# Patient Record
Sex: FEMALE | Race: BLACK OR AFRICAN AMERICAN | Employment: UNEMPLOYED | URBAN - METROPOLITAN AREA
[De-identification: names, ages, dates, MRNs, and addresses within clinical notes are randomized per-mention and may not be internally consistent; named-entity substitution may affect disease eponyms.]

---

## 2022-09-13 DIAGNOSIS — Z77.120 MOLD EXPOSURE: Primary | ICD-10-CM

## 2022-09-20 DIAGNOSIS — Z77.120 MOLD EXPOSURE: ICD-10-CM

## 2022-09-20 PROCEDURE — 71046 X-RAY EXAM CHEST 2 VIEWS: CPT | Performed by: INTERNAL MEDICINE

## 2022-11-01 ENCOUNTER — OFFICE VISIT (OUTPATIENT)
Dept: PULMONOLOGY | Age: 57
End: 2022-11-01

## 2022-11-01 VITALS
HEART RATE: 87 BPM | RESPIRATION RATE: 20 BRPM | OXYGEN SATURATION: 97 % | WEIGHT: 176 LBS | TEMPERATURE: 98 F | SYSTOLIC BLOOD PRESSURE: 120 MMHG | DIASTOLIC BLOOD PRESSURE: 80 MMHG | HEIGHT: 64 IN | BODY MASS INDEX: 30.05 KG/M2

## 2022-11-01 DIAGNOSIS — Z77.120 MOLD EXPOSURE: Primary | ICD-10-CM

## 2022-11-01 DIAGNOSIS — R06.02 SOB (SHORTNESS OF BREATH): ICD-10-CM

## 2022-11-01 LAB
EXPIRATORY TIME: NORMAL
FEF 25-75% %PRED-PRE: NORMAL
FEF 25-75% PRED: NORMAL
FEF 25-75%-PRE: NORMAL
FEV1 %PRED-PRE: 85 %
FEV1 PRED: NORMAL
FEV1/FVC %PRED-PRE: NORMAL
FEV1/FVC PRED: NORMAL
FEV1/FVC: 79 %
FEV1: 1.89 L
FVC %PRED-PRE: 85 %
FVC PRED: NORMAL
FVC: 2.38 L
PEF %PRED-PRE: NORMAL
PEF PRED: NORMAL
PEF-PRE: NORMAL

## 2022-11-01 PROCEDURE — 94010 BREATHING CAPACITY TEST: CPT | Performed by: INTERNAL MEDICINE

## 2022-11-01 PROCEDURE — 99204 OFFICE O/P NEW MOD 45 MIN: CPT | Performed by: INTERNAL MEDICINE

## 2022-11-01 RX ORDER — ALBUTEROL SULFATE 90 UG/1
2 AEROSOL, METERED RESPIRATORY (INHALATION) EVERY 6 HOURS PRN
Qty: 1 EACH | Refills: 11 | Status: SHIPPED | OUTPATIENT
Start: 2022-11-01

## 2022-11-01 ASSESSMENT — PULMONARY FUNCTION TESTS
FVC: 2.38
FVC_PERCENT_PREDICTED_PRE: 85
FEV1_PERCENT_PREDICTED_PRE: 85
FEV1: 1.89
FEV1/FVC: 79

## 2022-11-01 NOTE — PROGRESS NOTES
Patient Name:  Watson De La Paz                             YOB: 1965  MRN: [id number]                                              Office Visit 11/1/2022    ASSESSMENT AND PLAN:  (Medical Decision Making)    Maday Mccormick was seen today for shortness of breath. Diagnoses and all orders for this visit:    Mold exposure: The significance of this is unclear. Its extremely rare to have fungal infections related to mold exposure. More likely it could affect allergies or asthma. She does have some remote history of asthma and allergies it sounds like so this very well may be aggravating symptoms. We discussed that we would need to perform further evaluation to attempt to tie any allergen exposure to respiratory symptoms and currently plan to do a methacholine challenge. She can use albuterol as needed for now and would recommend a trial of over-the-counter antihistamine such as Zyrtec as well. If there were really need for legal documentation she would probably need an allergy evaluation to demonstrate allergy to the reported allergen. -     albuterol sulfate HFA (PROVENTIL;VENTOLIN;PROAIR) 108 (90 Base) MCG/ACT inhaler; Inhale 2 puffs into the lungs every 6 hours as needed for Wheezing    SOB (shortness of breath): Sounds to have some remote history of asthma and some family history of mild intermittent asthma. We will get methacholine scheduled to confirm a diagnosis of asthma and can try albuterol as needed for now. If evidence for asthma persistent symptoms we could add an inhaled steroid in the near future. -     Spirometry Without Bronchodilator  -     albuterol sulfate HFA (PROVENTIL;VENTOLIN;PROAIR) 108 (90 Base) MCG/ACT inhaler;  Inhale 2 puffs into the lungs every 6 hours as needed for Wheezing    Orders Placed This Encounter   Medications    albuterol sulfate HFA (PROVENTIL;VENTOLIN;PROAIR) 108 (90 Base) MCG/ACT inhaler     Sig: Inhale 2 puffs into the lungs every 6 hours as needed for Wheezing     Dispense:  1 each     Refill:  11     No orders of the defined types were placed in this encounter. Follow-up and Dispositions    Return in about 3 months (around 2/1/2023) for With Sine, Methacholine Challenge. Zahra Apodaca MD  _________________________________________________________________________    HISTORY OF PRESENT ILLNESS:    Ms. Liz Brandon is a 49 y.o. female who is seen at [facility] today for  Shortness of Breath  She is a 61-year-old female who recently is referred to us after an urgent care visit for mold and mildew exposure in her apartment building. She reports that she complained about this and finally got some treatment done 30 days later, but still smells abnormal and does not think the problem has been completely resolved. She has had increased cough and shortness of breath as has her daughter over the last couple months that this has been occurring. She is attempted to work with the apartment  to relocate permanently or temporarily while repairs are under gone, but this has been declined. She reports her daughter had asthma as a child and she thinks she had asthma, but neither of them had had symptoms and well over a decade. She has not used any allergy medicines or inhalers. She denies any fevers, chills, night sweats or weight loss. REVIEW OF SYSTEMS: 10 point review of systems is negative except as reported in HPI. PHYSICAL EXAM: Body mass index is 30.21 kg/m². Vitals:    11/01/22 1107   BP: 120/80   Pulse: 87   Resp: 20   Temp: 98 °F (36.7 °C)   TempSrc: Temporal   SpO2: 97%   Weight: 176 lb (79.8 kg)   Height: 5' 4\" (1.626 m)         General:   Alert, cooperative, no distress, appears stated age. Eyes:   Conjunctivae/corneas clear.  PERRL        Mouth/Throat:  Lips, mucosa, and tongue normal. Teeth and gums normal.        Lungs:     clear bilaterally     Heart:   Regular rate and rhythm, S1, S2 normal, no murmur, click, rub or gallop. Abdomen:    Soft, non-tender. Extremities:  Extremities normal, atraumatic, no cyanosis or edema. Skin:  Skin color normal. No rashes or lesions     Neurologic:  A&Ox3     DIAGNOSTIC TESTS:                                                                                    LABS: No results found for: WBC, HGB, HCT, PLT, TSH, IGE, NTPROBNP, ALEX, ANCA, RF, ESR, CRP  Imaging: I performed an independent interpretation of the patient's images. CXR: 9/20/2022: normal chest    XR CHEST STANDARD TWO VW     CT Chest: No results found for this or any previous visit from the past 3650 days. Nuclear Medicine: No results found for this or any previous visit from the past 3650 days. PFTs:   Office Spirometry Results Latest Ref Rng & Units 11/1/2022   FVC L 2.38   FEV1 L 1.89   FEV1 %PRED-PRE % 85   FVC %PRED-PRE % 85   FEV1/FVC % 79     No results found for this or any previous visit. No results found for this or any previous visit. FeNO: No results found for this or any previous visit. FeNO and Likelihood of Eosinophilic Asthma   Unlikely Intermediate Likely   <25 ppb 25-50 ppb >50ppb   Exercise Oximetry:  Echo: No results found for this or any previous visit from the past 3650 days. [facility] Reference Info:                                                                                                                Exposure History:  Second Hand Smoke Exposure: No  Birds: No  Asbestos: No  TB: No  Hot Tubs/Humidifier: No  Organic/Inorganic Dusts: No  Molds: Yes  No past medical history on file.      Tobacco Use      Smoking status: Never      Smokeless tobacco: Never    No Known Allergies  Current Outpatient Medications   Medication Instructions    albuterol sulfate HFA (PROVENTIL;VENTOLIN;PROAIR) 108 (90 Base) MCG/ACT inhaler 2 puffs, Inhalation, EVERY 6 HOURS PRN

## 2022-11-01 NOTE — LETTER
[facility]  [address] [place] [address] [place] [address]  Phone: [phone]  Fax: [phone]    Scooter Pavon MD    November 1, 2022     [address] [place] [address] [address] Ede Garibay [id number]    Patient: Abhishek Briscoe   MR Number: [id number]   YOB: 1965   Date of Visit: 11/1/2022       Dear /Ma'am:    Ms. Chetan Black has been referred to us for recently increased respiratory symptoms. We have concerns that mold exposure within her household may be aggravating respiratory issues such as asthma or allergies. Our evaluation is ongoing, but as much avoidance of molds as can be accomplished would be beneficial to this patient. Thank you for your gracious assistance with this matter.     Sincerely,          Scooter Pavon MD